# Patient Record
Sex: MALE | Race: WHITE
[De-identification: names, ages, dates, MRNs, and addresses within clinical notes are randomized per-mention and may not be internally consistent; named-entity substitution may affect disease eponyms.]

---

## 2018-09-04 ENCOUNTER — HOSPITAL ENCOUNTER (EMERGENCY)
Dept: HOSPITAL 74 - JER | Age: 43
Discharge: HOME | End: 2018-09-04
Payer: COMMERCIAL

## 2018-09-04 VITALS — BODY MASS INDEX: 35.2 KG/M2

## 2018-09-04 VITALS — TEMPERATURE: 97.9 F

## 2018-09-04 VITALS — HEART RATE: 70 BPM | DIASTOLIC BLOOD PRESSURE: 64 MMHG | SYSTOLIC BLOOD PRESSURE: 132 MMHG

## 2018-09-04 DIAGNOSIS — R10.11: Primary | ICD-10-CM

## 2018-09-04 LAB
ALBUMIN SERPL-MCNC: 3.7 G/DL (ref 3.4–5)
ALP SERPL-CCNC: 99 U/L (ref 45–117)
ALT SERPL-CCNC: 42 U/L (ref 12–78)
ANION GAP SERPL CALC-SCNC: 9 MMOL/L (ref 8–16)
AST SERPL-CCNC: 30 U/L (ref 15–37)
BASOPHILS # BLD: 0.6 % (ref 0–2)
BILIRUB SERPL-MCNC: 0.3 MG/DL (ref 0.2–1)
BUN SERPL-MCNC: 22 MG/DL (ref 7–18)
CALCIUM SERPL-MCNC: 8.7 MG/DL (ref 8.5–10.1)
CHLORIDE SERPL-SCNC: 105 MMOL/L (ref 98–107)
CO2 SERPL-SCNC: 28 MMOL/L (ref 21–32)
CREAT SERPL-MCNC: 0.9 MG/DL (ref 0.7–1.3)
DEPRECATED RDW RBC AUTO: 14.8 % (ref 11.9–15.9)
EOSINOPHIL # BLD: 4.8 % (ref 0–4.5)
GLUCOSE SERPL-MCNC: 94 MG/DL (ref 74–106)
HCT VFR BLD CALC: 40.3 % (ref 35.4–49)
HGB BLD-MCNC: 13.5 GM/DL (ref 11.7–16.9)
LIPASE SERPL-CCNC: 143 U/L (ref 73–393)
LYMPHOCYTES # BLD: 34.5 % (ref 8–40)
MCH RBC QN AUTO: 26.1 PG (ref 25.7–33.7)
MCHC RBC AUTO-ENTMCNC: 33.4 G/DL (ref 32–35.9)
MCV RBC: 78.3 FL (ref 80–96)
MONOCYTES # BLD AUTO: 8.9 % (ref 3.8–10.2)
NEUTROPHILS # BLD: 51.2 % (ref 42.8–82.8)
PLATELET # BLD AUTO: 251 K/MM3 (ref 134–434)
PMV BLD: 7.3 FL (ref 7.5–11.1)
POTASSIUM SERPLBLD-SCNC: 4.1 MMOL/L (ref 3.5–5.1)
PROT SERPL-MCNC: 7.4 G/DL (ref 6.4–8.2)
RBC # BLD AUTO: 5.15 M/MM3 (ref 4–5.6)
SODIUM SERPL-SCNC: 142 MMOL/L (ref 136–145)
WBC # BLD AUTO: 8 K/MM3 (ref 4–10)

## 2018-09-04 NOTE — PDOC
History of Present Illness





- General


History Source: Patient


Exam Limitations: No Limitations





- History of Present Illness


Initial Comments: 





09/04/18 02:22


Patient is a 43 year old with no pmhx c/o RUQ abd pain that radiates to the 

back.  He has been having this pain for many years but over the past few weeks 

the pain has been getting more frequent. The pain usually last for 15 mins 

interval.  Tonight the pain was 10/10 sharp, stabbing and woke him up from sleep

, no aggravating or alleviating factors.  States sometimes when he get the pain 

has cold sweats, and nausea, no vomiting, no fever, no left sided chest pain.  

Denies any food contact for the pain.  No recent travel.  Fam HX neg for CVA/MI/

PE/DVT.





PMD: Dr. Bai


PMHX:  neg


PSOCHX:  neg drug, occ etoh, neg cig


ALL:  NKDA 





GENERAL/CONSTITUTIONAL: [No fever or chills. No weakness. No weight change.]


HEAD, EYES, EARS, NOSE AND THROAT: [No change in vision. No ear pain or 

discharge. No sore throat.]


CARDIOVASCULAR: [No chest pain or shortness of breath.]


RESPIRATORY: [No cough, wheezing, or hemoptysis.]


GASTROINTESTINAL: (+) nausea, (-) vomiting, diarrhea or constipation. No rectal 

bleeding.]


GENITOURINARY: [No dysuria, frequency, or change in urination.]


MUSCULOSKELETAL: [No joint or muscle swelling or pain. No neck or back pain.]


SKIN AND BREASTS: [No rash or easy bruising.]


NEUROLOGIC: [No headache, vertigo, loss of consciousness, or loss of sensation.]


PSYCHIATRIC: [No depression or anxiety.]


ENDOCRINE: [No increased thirst. No abnormal weight change.]


HEMATOLOGIC/LYMPHATIC: [No anemia, easy bleeding, or history of blood clots.]


ALLERGIC/IMMUNOLOGIC: [No hives or skin allergy. No latex allergy.]





GENERAL: [The patient is awake, alert, and fully oriented, in no acute distress.

]


HEAD: [Normal with no signs of trauma.]


EYES: [Pupils equal, round and reactive to light, extraocular movements intact, 

sclera anicteric, conjunctiva clear.]


ENT: [Ears normal, nares patent, oropharynx clear without exudates.  Moist 

mucous membranes.]


NECK: [Normal range of motion, supple without lymphadenopathy, JVD, or masses.]


LUNGS: [Breath sounds equal, clear to auscultation bilaterally.  No wheezes, 

and no crackles.]


HEART: [Regular rate and rhythm, normal S1 and S2 without murmur, rub.]


ABDOMEN: [Soft, (+) tenderness RUQ, normoactive bowel sounds.  No guarding, no 

rebound.  No masses.]


EXTREMITIES: [Normal range of motion, no edema.  No clubbing or cyanosis. No 

cords, erythema, or tenderness.]


NEUROLOGICAL: [Cranial nerves II through XII grossly intact.  Normal speech, 

normal gait.]


PSYCH: [Normal mood, normal affect.]


SKIN: [Warm, Dry, normal turgor, no rashes or lesions noted.]





<Russel Pillai - Last Filed: 09/04/18 04:51>





<Manjula Best - Last Filed: 09/04/18 05:29>





- General


Chief Complaint: Pain


Stated Complaint: SHARP ABDOMINAL PAIN





Past History





- Past Medical History


COPD: No





- Suicide/Smoking/Psychosocial Hx


Smoking History: Never smoked


Have you smoked in the past 12 months: No


Information on smoking cessation initiated: No


Hx Alcohol Use: Yes (social)


Drug/Substance Use Hx: No


Substance Use Type: None





<Russel Pillai - Last Filed: 09/04/18 04:51>





<Manjula Best - Last Filed: 09/04/18 05:29>





- Past Medical History


Allergies/Adverse Reactions: 


 Allergies











Allergy/AdvReac Type Severity Reaction Status Date / Time


 


No Known Allergies Allergy   Verified 09/04/18 02:30











Home Medications: 


Ambulatory Orders





NK [No Known Home Medication]  09/04/18 











*Physical Exam





- Vital Signs


 Last Vital Signs











Temp Pulse Resp BP Pulse Ox


 


 97.9 F   68   18   128/68   100 


 


 09/04/18 02:00  09/04/18 02:00  09/04/18 02:00  09/04/18 02:00  09/04/18 02:00














<Russel Pillai - Last Filed: 09/04/18 04:51>





- Vital Signs


 Last Vital Signs











Temp Pulse Resp BP Pulse Ox


 


 97.9 F   70   18   132/64   99 


 


 09/04/18 02:00  09/04/18 04:26  09/04/18 04:26  09/04/18 04:26  09/04/18 04:26














<Manjula eBst - Last Filed: 09/04/18 05:29>





ED Treatment Course





- LABORATORY


CBC & Chemistry Diagram: 


 09/04/18 03:35





 09/04/18 02:40





<Russel Pillai - Last Filed: 09/04/18 04:51>





- LABORATORY


CBC & Chemistry Diagram: 


 09/04/18 03:35





 09/04/18 02:40





- ADDITIONAL ORDERS


Additional order review: 


 Laboratory  Results











  09/04/18





  02:40


 


Sodium  142


 


Potassium  4.1


 


Chloride  105


 


Carbon Dioxide  28


 


Anion Gap  9


 


BUN  22 H


 


Creatinine  0.9


 


Creat Clearance w eGFR  > 60


 


Random Glucose  94


 


Calcium  8.7


 


Total Bilirubin  0.3


 


AST  30


 


ALT  42


 


Alkaline Phosphatase  99


 


Total Protein  7.4


 


Albumin  3.7


 


Lipase  143








 











  09/04/18 09/04/18





  03:35 02:40


 


RBC  5.15  Cancelled


 


MCV  78.3 L  Cancelled


 


MCHC  33.4  Cancelled


 


RDW  14.8  Cancelled


 


MPV  7.3 L  Cancelled


 


Neutrophils %  51.2  Cancelled


 


Lymphocytes %  34.5  Cancelled


 


Monocytes %  8.9  Cancelled


 


Eosinophils %  4.8 H  Cancelled


 


Basophils %  0.6  Cancelled














- Medications


Given in the ED: 


ED Medications














Discontinued Medications














Generic Name Dose Route Start Last Admin





  Trade Name Freq  PRN Reason Stop Dose Admin


 


Acetaminophen  650 mg  09/04/18 02:45  09/04/18 02:56





  Tylenol -  PO  09/04/18 02:46  650 mg





  ONCE ONE   Administration





     





     





     





     














<Manjula Best - Last Filed: 09/04/18 05:29>





Medical Decision Making





- Medical Decision Making





09/04/18 02:22


Patient is a 43 year old with no pmhx c/o RUQ abd pain that radiates to the 

back.  He has been having this pain for many years but over the past few weeks 

the pain has been getting more frequent. 


DDx billary colic, esophageal spam, gastritis, 


bedside US


pain meds offed but refused





EKG SR rate 72, NAD, (-) ST-T wave changes





labs reviewed no acute finding





will discharge with inst to follow up with GI





I discussed the physical exam findings, ancillary test results and final 

diagnoses with the patient. I answered all of the patient's questions. The 

patient was satisfied with the care received and felt comfortable with the 

discharge plan and treatment plan.  The Patient agrees to follow up with the 

primary care physician within 24-72 hours.











<Jhony Pillai - Last Filed: 09/04/18 04:51>





- Medical Decision Making


The patient was seen and evaluated in conjunction with midlevel provider under 

my direct supervision, ancillary studies were reviewed.  I agree with the plan 

as outlined by CHASTITY Alegria. 





09/04/18 05:28








<Manjula Best - Last Filed: 09/04/18 05:29>





*DC/Admit/Observation/Transfer





<Russel Pillai - Last Filed: 09/04/18 04:51>





<Manjula Best - Last Filed: 09/04/18 05:29>


Diagnosis at time of Disposition: 


 RUQ abdominal pain








- Discharge Dispostion


Disposition: HOME


Condition at time of disposition: Stable





- Referrals


Referrals: 


Bruce Espino MD [Staff Physician] - 





- Patient Instructions


Printed Discharge Instructions:  DI for Abdominal Pain-Adult


Additional Instructions: 


Your Discharge Instructions:


You must call primary care physician within 24 hours to arrange follow-up.  

Return to the Emergency Department with any new, persistent or worsening 

symptoms, for fever, chills, SOB, dizziness or any other concerning changes 

that may occur.   follow-up with , call to make .

## 2018-09-04 NOTE — EKG
Test Reason : 

Blood Pressure : ***/*** mmHG

Vent. Rate : 072 BPM     Atrial Rate : 072 BPM

   P-R Int : 202 ms          QRS Dur : 098 ms

    QT Int : 380 ms       P-R-T Axes : 029 030 018 degrees

   QTc Int : 416 ms

 

NORMAL SINUS RHYTHM

NORMAL ECG

NO PREVIOUS ECGS AVAILABLE

Confirmed by Huan Garcia ([id number]) on 9/4/2018 2:33:35 PM

 

Referred By:             Confirmed By:Huan Garcia

## 2019-07-24 ENCOUNTER — HOSPITAL ENCOUNTER (EMERGENCY)
Dept: HOSPITAL 74 - JER | Age: 44
Discharge: HOME | End: 2019-07-24
Payer: COMMERCIAL

## 2019-07-24 VITALS — BODY MASS INDEX: 37 KG/M2

## 2019-07-24 VITALS — DIASTOLIC BLOOD PRESSURE: 83 MMHG | HEART RATE: 75 BPM | SYSTOLIC BLOOD PRESSURE: 125 MMHG

## 2019-07-24 VITALS — TEMPERATURE: 97.7 F

## 2019-07-24 DIAGNOSIS — F41.9: Primary | ICD-10-CM

## 2019-07-24 DIAGNOSIS — G47.00: ICD-10-CM

## 2019-07-24 NOTE — PDOC
History of Present Illness





- General


Chief Complaint: Psychiatric


Stated Complaint: UNABLE TO SLEEP


Time Seen by Provider: 07/24/19 05:11


History Source: Patient





- History of Present Illness


Initial Comments: 


44-year-old male complaining of feeling anxious and unable to sleep since 1 AM. 

Patient reports that he is having anxiety about financial situations and health 

related concerns for wife. As per wife patient for the last several years has 

been taking NyQuil before bedtime to optimize sleep. Patient denies taking 

NyQuil last night and woke up at 1 AM with severe anxiety. Patient at this time 

alert awake oriented 3 


Denies suicidal r homicidal ideation.








Past History





- Past Medical History


Allergies/Adverse Reactions: 


Allergies





No Known Allergies Allergy (Verified 09/04/18 02:30)


 








Home Medications: 


Ambulatory Orders





Alprazolam [Xanax] 0.25 mg PO HS #7 tablet MDD 1 07/24/19 











- Social History


Smoking Status: Never smoked





*Review of Systems





- Review of Systems


Able to Perform ROS?: Yes


Constitutional: No: Symptoms Reported, See HPI, Chills, Diaphoresis, Fever, 

Loss of Appetite, Malaise, Night Sweats, Weakness, Weight Stable, Unintentional 

Wgt. Loss, Unexplained wgt Loss, Other


Neurological: No: Symptoms reported, See HPI, Headache, Numbness, Paresthesia, 

Pre-Existing Deficit, Seizure, Tingling, Tremors, Weakness, Unsteady Gait, 

Ataxia, Dizziness, Other


Psychiatric: Yes: Anxiety, Stressors, Sleep Pattern Change





*Physical Exam





- Vital Signs


 Last Vital Signs











Temp Pulse Resp BP Pulse Ox


 


 97.7 F   72   17   129/81   98 


 


 07/24/19 03:26  07/24/19 03:26  07/24/19 03:26  07/24/19 03:26  07/24/19 03:26














- Physical Exam


General Appearance: Yes: Appropriately Dressed


Respiratory/Chest: positive: Lungs Clear


Cardiovascular: positive: Regular Rhythm, Regular Rate


Neurologic: positive: Fully Oriented, Alert, Other (no eye contact. appears to 

be anxious)





Plan





- Progress Note


Progress Note: 





07/24/19 07:01


A: anxiety; insomnia








P; patient to follow up with PCP and a referral to psychiatry was given today


07/24/19 07:02








*DC/Admit/Observation/Transfer


Diagnosis at time of Disposition: 


 Anxiety





Insomnia


Qualifiers:


 Insomnia type: unspecified Qualified Code(s): G47.00 - Insomnia, unspecified








- Discharge Dispostion


Disposition: HOME


Condition at time of disposition: Fair





- Prescriptions


Prescriptions: 


Alprazolam [Xanax] 0.25 mg PO HS #7 tablet MDD 1





- Referrals


Referrals: 


Alison Hall MD [Primary Care Provider] - Call tomorrow


Citlali Manzano MD [Staff Physician] - 





- Patient Instructions


Printed Discharge Instructions:  Anxiety and Panic Attacks (Alternative Therapy)


Additional Instructions: 


please follwo up with your doctor as possible








you were given a few doses of Xanax for anxiety











Additional Instructions:


* Please call your personal physician to report your Emergency Department visit 

and to report your progress, if any.


* If there is no improvement in symptoms in 2 days call your physician.


* Return to the Emergency Department for any worsening symptoms.








- Post Discharge Activity


Forms/Work/School Notes:  Back to Work

## 2019-07-24 NOTE — PDOC
*Physical Exam





- Vital Signs


 Last Vital Signs











Temp Pulse Resp BP Pulse Ox


 


 97.7 F   72   17   129/81   98 


 


 07/24/19 03:26  07/24/19 03:26  07/24/19 03:26  07/24/19 03:26  07/24/19 03:26














Medical Decision Making





- Medical Decision Making





07/24/19 05:38


Patient seen by the advanced practice provider under my direct supervision. 

Ancillary testing reviewed as necessary.


I agree with plan as outlined by the advanced practice provider.





*DC/Admit/Observation/Transfer


Diagnosis at time of Disposition: 


 Insomnia








- Discharge Dispostion


Condition at time of disposition: Fair





- Referrals


Referrals: 


Alison Hall MD [Primary Care Provider] - 





- Patient Instructions





- Post Discharge Activity

## 2019-09-30 ENCOUNTER — HOSPITAL ENCOUNTER (EMERGENCY)
Dept: HOSPITAL 74 - JERFT | Age: 44
Discharge: HOME | End: 2019-09-30
Payer: COMMERCIAL

## 2019-09-30 VITALS — DIASTOLIC BLOOD PRESSURE: 79 MMHG | TEMPERATURE: 98.3 F | HEART RATE: 67 BPM | SYSTOLIC BLOOD PRESSURE: 126 MMHG

## 2019-09-30 VITALS — BODY MASS INDEX: 35.9 KG/M2
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
## 2019-09-30 DIAGNOSIS — M25.512: Primary | ICD-10-CM
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
## 2019-09-30 PROCEDURE — 3E0233Z INTRODUCTION OF ANTI-INFLAMMATORY INTO MUSCLE, PERCUTANEOUS APPROACH: ICD-10-PCS | Performed by: EMERGENCY MEDICINE

## 2019-09-30 NOTE — PDOC
Rapid Medical Evaluation


Time Seen by Provider: 09/30/19 13:55


Medical Evaluation: 


 Allergies











Allergy/AdvReac Type Severity Reaction Status Date / Time


 


No Known Allergies Allergy   Verified 09/30/19 13:55











09/30/19 13:56


CC: left shoulder pain s/p fall while playing cricket





PE: TTP over left AC joint





Orders: xray, toradol





Pt will proceed to ER for further evaluation.





**Discharge Disposition





- Diagnosis


 Left shoulder pain








- Referrals





- Patient Instructions





- Post Discharge Activity

## 2019-09-30 NOTE — PDOC
History of Present Illness





- General


Chief Complaint: Injury


Stated Complaint: fell on left shoulder


Time Seen by Provider: 09/30/19 13:55





- History of Present Illness


Initial Comments: 





09/30/19 14:22


43 y/o M w/PMH of anxiety presents for evaluation of L shoulder pain after a 

fall and roll which occurred while playing cricket yesterday





Past History





- Past Medical History


Allergies/Adverse Reactions: 


 Allergies











Allergy/AdvReac Type Severity Reaction Status Date / Time


 


No Known Allergies Allergy   Verified 09/30/19 13:55











Home Medications: 


Ambulatory Orders





Alprazolam [Xanax] 0.25 mg PO HS #7 tablet MDD 1 07/24/19 


Paroxetine HCl [Paxil Cr] 25 mg PO DAILY 09/30/19 








COPD: No


Psychiatric Problems: Yes (anxiety)





- Immunization History


Immunization Up to Date: Yes





- Psycho Social/Smoking Cessation Hx


Smoking History: Current some day smoker


Have you smoked in the past 12 months: Yes


Information on smoking cessation initiated: Yes


Hx Alcohol Use: No


Drug/Substance Use Hx: No


Substance Use Type: None





**Review of Systems





- Review of Systems


Musculoskeletal: Yes: Joint Pain





*Physical Exam





- Vital Signs


 Last Vital Signs











Temp Pulse Resp BP Pulse Ox


 


 98.3 F   67   16   126/79   100 


 


 09/30/19 13:57  09/30/19 13:57  09/30/19 13:57  09/30/19 13:57  09/30/19 13:57














- Physical Exam


Comments: 





09/30/19 14:23


L shoulder full ROM with pain at terminal ranges. 3/5 SSI and ER + impingment 

maneuvers. No gross sensory or motor deficits NVID.





Discharge





- Discharge Information


Problems reviewed: Yes


Clinical Impression/Diagnosis: 


 Left shoulder pain





Condition: Stable


Disposition: HOME





- Admission


No





- Follow up/Referral


Referrals: 


All Yañez MD [Primary Care Provider] - 


Noé Burt DO [Staff Physician] - 





- Patient Discharge Instructions


Additional Instructions: 


Return to the emergency room for worsening symptoms. Without fail, please 

follow up with orthopedic surgery in 1-2 days for further evaluation and 

treatment options. Continue with Motrin as directed for pain. 





- Post Discharge Activity

## 2020-01-10 ENCOUNTER — HOSPITAL ENCOUNTER (OUTPATIENT)
Dept: HOSPITAL 74 - FASU | Age: 45
Discharge: HOME | End: 2020-01-10
Attending: ORTHOPAEDIC SURGERY
Payer: COMMERCIAL

## 2020-01-10 VITALS — DIASTOLIC BLOOD PRESSURE: 76 MMHG | HEART RATE: 78 BPM | SYSTOLIC BLOOD PRESSURE: 132 MMHG

## 2020-01-10 VITALS — BODY MASS INDEX: 35.9 KG/M2

## 2020-01-10 VITALS — TEMPERATURE: 97.8 F

## 2020-01-10 DIAGNOSIS — M75.52: ICD-10-CM

## 2020-01-10 DIAGNOSIS — M75.02: ICD-10-CM
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
## 2020-01-10 DIAGNOSIS — M67.813: ICD-10-CM

## 2020-01-10 DIAGNOSIS — M65.812: ICD-10-CM
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
## 2020-01-10 DIAGNOSIS — M75.112: Primary | ICD-10-CM

## 2020-01-10 DIAGNOSIS — S43.432A: ICD-10-CM

## 2020-01-10 PROCEDURE — 0RNK4ZZ RELEASE LEFT SHOULDER JOINT, PERCUTANEOUS ENDOSCOPIC APPROACH: ICD-10-PCS | Performed by: ORTHOPAEDIC SURGERY

## 2020-01-10 PROCEDURE — 0RBK4ZZ EXCISION OF LEFT SHOULDER JOINT, PERCUTANEOUS ENDOSCOPIC APPROACH: ICD-10-PCS | Performed by: ORTHOPAEDIC SURGERY

## 2020-01-10 PROCEDURE — 0LS24ZZ REPOSITION LEFT SHOULDER TENDON, PERCUTANEOUS ENDOSCOPIC APPROACH: ICD-10-PCS | Performed by: ORTHOPAEDIC SURGERY

## 2020-01-10 PROCEDURE — 0LQ24ZZ REPAIR LEFT SHOULDER TENDON, PERCUTANEOUS ENDOSCOPIC APPROACH: ICD-10-PCS | Performed by: ORTHOPAEDIC SURGERY

## 2020-01-10 NOTE — HP
History & Physical Update





- Physical


Physical: No Change





- Assessment


Assessment: No Change





- Plan


Plan: No Change

## 2020-01-10 NOTE — OPR
Date of Procedure: 1/10/2020





Procedure: Left Shoulder-


1. Diagnostic arthroscopy.


2. Arthroscopic limited debridement of glenohumeral joint (67143).


3. Arthroscopic subacromial decompression (34915).


4. Arthroscopic rotator cuff repair: Supraspinatus (67526).


5. Arthroscopic-assisted biceps tenodesis-subpectoral (85853).





Preoperative Diagnoses: 


1. Rotator cuff tear- Supraspinatus.


2. Biceps tendon degeneration.


3. Glenohumeral synovitis.





Postoperative Diagnoses: 


1. Rotator cuff tear- Supraspinatus, 1 x 1.5cm.


2. Biceps tendon degeneration and tenosynovitis.


3. Labral degeneration and fraying; type II SLAP tear.


4. Glenohumeral synovitis.


5. Subacromial bursitis and adhesions.





Surgeon: Allan Nielsen DO





Assistants: Noé Burt DO





Anesthesia: IV regional with interscalene nerve block





Estimated Blood Loss: Minimal





Drains: None





Total IV Fluids: Per anesthesia record





Specimens: Shavings





Implants: [employer] 4.5mm FootPrint PEEK Sims with (2) UltraTape 

Sutures; 1.9mm SutureFix, Double loaded with suture 





Complications: None





Disposition: PACU





Condition: Hemodynamically stable





Indications: Matteo Small presented to us with chronic right shoulder pain 

that failed conservative measures. His symptoms, signs, and imaging were 

consistent with the above noted diagnoses. He ultimately elected to proceed 

with surgical intervention after discussion of the risks, benefits, 

alternatives. We discussed risks including but not limited to, bleeding, pain, 

infection, scarring, damage to neurovascular structures, blood clots, pulmonary 

embolus, need for additional surgery, incomplete relief of pain, and incomplete 

return of function. He expressed understanding and wished to proceed.





He underwent preoperative medical evaluation clearance and optimization prior 

to surgery.





Procedure Details: 


He was identified in the preoperative area. The left shoulder was marked as the 

operative site and consent was completed and confirmed.


An interscalene block was performed in the holding area by a member of the 

anesthesia team.  He was later transferred to the operating room and placed in 

supine position the operating room. General anesthesia was induced without 

difficulty. He was repositioned into beach chair with all bony prominences 

appropriately padded. The neck was in neutral alignment. 





A surgical time-out was performed identifying the correct patient, procedure, 

and site.





Antibiotics were given within 1 hour prior to surgical incision.





The upper extremity was prepped and draped in standard sterile fashion.





Examination under anesthesia: Passive range of motion of the left  shoulder 

showed forward elevation of 170, abduction 100, external rotation at side 40

, SABER 80, SABIR 420. This was compared to her contralateral shoulder which 

shows forward elevation of 170, abduction 100 external rotation at side 60, 

SABER 90, SABIR 40.





Diagnostic arthroscopy: We began the procedure with the standard posterolateral 

portal, entered the glenohumeral joint, and an anterior portal was made within 

the rotator cuff interval under direct visualization with the assistance of a 

spinal needle. A probe was used to assist with diagnostic arthroscopy and we 

visualized from both posteriorly and anteriorly.





Evaluation of the glenohumeral joint showed mild to moderate synovitis 

anteriorly and superiorly. The superior labrum had a type II tear that was 

debrided back to a stable base. The anterior labrum was probed and found to be 

intact. The posterior labrum was probed and found to be intact. There were no 

loose bodies in the inferior pouch. There was no HAGL lesion. The biceps tendon 

showed hyperemia. The rotator cuff interval was normal. The axillary recess was 

empty. The subscapularis was probed and found to be intact. The supraspinatus 

tendon was found to be torn from the greater tuberosity. The articular surface 

of the infraspinatus and teres minor tendons were intact. The glenoid and 

humeral head showed no significant chondral defects.


 


Evaluation of the subacromial space showed moderate bursitis and adhesions. 

There was a small acromial spur anteriorly. There was fraying of the CA 

ligament. The AC joint was intact. Ther rotator cuff was found to be torn from 

the tuberosity. This tear was measured to be 1 x 1.5cm. 





Arthroscopic limited debridement of the glenohumeral joint: We used a 

combination of the arthroscopic motorized shaver and radiofrequency device to 

perform a limited debridement inside the glenohumeral joint. The hyperemia, 

erythema, and synovitis of the joint and joint capsule was focally debrided. 

Synovitic fronds were thermally ablated. Labral fraying and degeneration was 

also resected and debrided to a stable edge. We used the radiofrequency device 

to ablate and remove synovitis and scar tissue starting with the rotator cuff 

interval. We stayed lateral to the labrum, released the scar to the lateral 

surface of the coracoid, and then proceeded more laterally across the rotator 

interval, ablating the scar tissue all the way to the biceps pulley region. The 

subscapularis tendon was identified and carefully protected. The scar above it 

including the ligaments and capsular tissue was ablated just lateral to the 

labral margin and the scar tissue was resected.The biceps tendon was 

tenotomized as it inserted into the superior labral complex and the remaining 

portion of the biceps tendon was allowed to retract into the bicipital groove 

for later tenodesis.





Arthroscopic-assisted biceps tenodesis: We made a 2 cm incision along Sharan's 

lines in the axillary fold. Sharp dissection was carried out down to the level 

of the pectoralis major. The plane between the pectoralis major and the short 

head of biceps tendon was developed bluntly down to the level of the humeral 

bone, where we identified the long head of biceps tendon and delivered it 

retrograde out of the wound. The underlying soft tissue was resected and the 

bone was frayed with a 1/4-inch osteotome. We then selected a 1.9 SutureFix 

anchor and placed the anchor high within the bicipital groove underneath the 

pectoralis major tendon. The sutures were then passed just proximal to the 

musculotendinous junction of the long head of biceps in an alternating simple 

versus lasso loop configuration. Tying these sutures down tenodesed the tendon 

onto the underlying frayed humeral bone. We used an arthroscopic knot pusher to 

get excellent knot fixation, and then arthroscopic  to cut the 

remaining length of the suture. The remaining length of the biceps tendon was 

resected. We confirmed our arthroscopic knots and position of the biceps tendon 

underneath the pectoralis major with the arthroscope. We thoroughly irrigated 

the wound. 





Arthroscopic subacromial decompression:The subacromial space was entered from 

posteriorly. A separate anterior-lateral portal was made for additional 

instrumentation and visualization. We then visualized the rotator cuff pattern 

as noted above, and performed our subacromial decompression in a systematic 

fashion from anterior to posterior and from lateral to medial, removing the 

bursal tissue carefully. This was done with a radiofrequency device and 

motorized shaver. The undersurface of the acromion was skeletonized and gently 

debrided to a flat smooth undersurface. There was a small anterior-inferior 

spur that was resected with a motorized bur. 





Arthroscopic rotator cuff repair: We turned our attention to rotator cuff 

repair of the supraspinatus. We debrided the remnant tissue over the greater 

tuberosity and debrided frayed tissue from the rotator cuff tendon edge. We 

debrided the greater tuberosity with a motorized bur to slightly decorticate it

, preparing a bony bed to receive the rotator cuff tendon. Two ultratape 

sutures were passed through the supraspinatus with the tissue-piercing Firstpass

, approximately 1.5 cm from its torn edge. The first was passed in a horizontal 

mattress suture and the second was passed in a simple pattern creating a rip 

stop configuration. The ultratape sutures were passed through a 4.5mm Footprint 

PEEK anchor and this was impacted into the greater tuberosity footprint. It had 

excellent fixation within the bone. We promoted some localized bone bleeding 

and marrow elements with an awl in the lateral aspect of the greater 

tuberosity. We thoroughly irrigated the subacromial space and we removed the 

arthroscopic equipment.





Wound closure: We thoroughly irrigated the axillary wound. The arthroscopic 

portal incisions were closed with 3-0 Monocryl subcuticular stitch with Steri 

strips. The axillary incision was closed with 2-0 Vicryl, 3-0 Monocryl 

subcuticular stitch, and Dermabond skin glue. The shoulder was sterilely 

dressed and placed in a shoulder immobilizer.





Post-operative Details:


I spoke with the wife regarding the operation after surgery.





Postoperative rehabilitation: 


Arthroscopic rotator cuff repair protocol. The patient will remain in a 

shoulder immobilizer for 4-5 weeks. Early passive range of motion okay with no 

limits and advance as tolerated; no pendulums. No strengthening for at least 5 

months.





Attestation for first assistant: Dr. Noé Burt acted as the first assistant. 

There was no qualified resident or physician assistant available to do so.

## 2020-01-14 NOTE — PATH
Surgical Pathology Report



Patient Name:  KATHRINE LIU

Accession #:  [id number]

Med. Rec. #:  [id number]                                                        

   /Age/Gender:  1975 (Age: 44) / M

Account:  [id number]                                                          

             Location: [facility] AMBULATORY 

Taken:  1/10/2020

Received:  1/10/2020

Reported:  2020

Physicians:  Allan Nielsen DO

  



Specimen(s) Received

A: SHAVINGS LEFT SHOULDER 

B: LEFT BICEP TENDON 





Clinical History

Rotator cuff tear left shoulder, bursitis, biceps tendinitis







Final Diagnosis

A. LEFT SHOULDER, ARTHROSCOPIC SHAVING: 

PORTIONS OF SYNOVIUM, CARTILAGE, SKELETAL MUSCLE AND BONE CONSISTENT WITH

ARTHROSCOPIC SHAVINGS.



B. SOFT TISSUE, LEFT BICEPS TENDON, EXCISION:

DENSE ORGANIZED FIBROUS CONNECTIVE TISSUE CONSISTENT WITH TENDON, WITH ATTACHED

PORTIONS OF SYNOVIUM.







***Electronically Signed***

Brandon Kevin M.D.





Gross Description

A. Received in formalin labeled "left shoulder shavings," is a 3.5 x 2.8 x 0.3

cm aggregate of tan-yellow soft tissue fragments. The specimen is entirely

submitted in one cassette.



B. Received in formalin labeled "left biceps tendon," is a 5.2 x 1.0 x 0.3 cm

tan portion of fibrous tissue, consistent with a portion of tendon.

Representative sections are submitted in one cassette.





saudi

## 2021-05-03 ENCOUNTER — HOSPITAL ENCOUNTER (EMERGENCY)
Dept: HOSPITAL 74 - JERFT | Age: 46
Discharge: HOME | End: 2021-05-03
Payer: COMMERCIAL

## 2021-05-03 VITALS — TEMPERATURE: 98.2 F | HEART RATE: 99 BPM | DIASTOLIC BLOOD PRESSURE: 87 MMHG | SYSTOLIC BLOOD PRESSURE: 151 MMHG

## 2021-05-03 VITALS — BODY MASS INDEX: 35.9 KG/M2

## 2021-05-03 DIAGNOSIS — S63.295A: ICD-10-CM

## 2021-05-03 DIAGNOSIS — S90.819A: Primary | ICD-10-CM

## 2021-05-03 PROCEDURE — 3E0234Z INTRODUCTION OF SERUM, TOXOID AND VACCINE INTO MUSCLE, PERCUTANEOUS APPROACH: ICD-10-PCS
